# Patient Record
Sex: MALE | Race: WHITE | NOT HISPANIC OR LATINO | Employment: OTHER | URBAN - METROPOLITAN AREA
[De-identification: names, ages, dates, MRNs, and addresses within clinical notes are randomized per-mention and may not be internally consistent; named-entity substitution may affect disease eponyms.]

---

## 2017-01-04 ENCOUNTER — HOSPITAL ENCOUNTER (OUTPATIENT)
Dept: CARDIOLOGY | Facility: CLINIC | Age: 72
Discharge: HOME | End: 2017-01-04
Payer: COMMERCIAL

## 2017-01-04 ENCOUNTER — OFFICE VISIT (OUTPATIENT)
Dept: ORTHOPEDICS | Facility: CLINIC | Age: 72
End: 2017-01-04
Payer: COMMERCIAL

## 2017-01-04 ENCOUNTER — HOSPITAL ENCOUNTER (OUTPATIENT)
Dept: RADIOLOGY | Facility: HOSPITAL | Age: 72
Discharge: HOME | End: 2017-01-04
Attending: ORTHOPAEDIC SURGERY
Payer: COMMERCIAL

## 2017-01-04 VITALS
BODY MASS INDEX: 25.29 KG/M2 | HEART RATE: 97 BPM | RESPIRATION RATE: 18 BRPM | WEIGHT: 166.88 LBS | TEMPERATURE: 98 F | HEIGHT: 68 IN | SYSTOLIC BLOOD PRESSURE: 160 MMHG | DIASTOLIC BLOOD PRESSURE: 80 MMHG

## 2017-01-04 DIAGNOSIS — M89.9 LESION OF LEFT HUMERUS: ICD-10-CM

## 2017-01-04 DIAGNOSIS — M89.9 LESION OF LEFT HUMERUS: Primary | ICD-10-CM

## 2017-01-04 PROCEDURE — 99999 PR PBB SHADOW E&M-EST. PATIENT-LVL IV: CPT | Mod: PBBFAC,,, | Performed by: ORTHOPAEDIC SURGERY

## 2017-01-04 PROCEDURE — 73060 X-RAY EXAM OF HUMERUS: CPT | Mod: TC,LT

## 2017-01-04 PROCEDURE — 71020 XR CHEST PA AND LATERAL PRE-OP: CPT | Mod: TC

## 2017-01-04 PROCEDURE — 99214 OFFICE O/P EST MOD 30 MIN: CPT | Mod: PBBFAC | Performed by: ORTHOPAEDIC SURGERY

## 2017-01-04 PROCEDURE — 73060 X-RAY EXAM OF HUMERUS: CPT | Mod: 26,LT,, | Performed by: RADIOLOGY

## 2017-01-04 PROCEDURE — 99205 OFFICE O/P NEW HI 60 MIN: CPT | Mod: S$PBB,,, | Performed by: ORTHOPAEDIC SURGERY

## 2017-01-04 PROCEDURE — 93005 ELECTROCARDIOGRAM TRACING: CPT | Mod: PBBFAC | Performed by: INTERNAL MEDICINE

## 2017-01-04 PROCEDURE — 93010 ELECTROCARDIOGRAM REPORT: CPT | Mod: S$PBB,,, | Performed by: INTERNAL MEDICINE

## 2017-01-04 PROCEDURE — 71020 XR CHEST PA AND LATERAL PRE-OP: CPT | Mod: 26,,, | Performed by: RADIOLOGY

## 2017-01-04 RX ORDER — ALBUTEROL SULFATE 90 UG/1
2 AEROSOL, METERED RESPIRATORY (INHALATION) EVERY 6 HOURS PRN
COMMUNITY

## 2017-01-04 RX ORDER — TAMSULOSIN HYDROCHLORIDE 0.4 MG/1
0.4 CAPSULE ORAL DAILY
COMMUNITY

## 2017-01-04 RX ORDER — OXYMETAZOLINE HCL 0.05 %
2 SPRAY, NON-AEROSOL (ML) NASAL 2 TIMES DAILY
COMMUNITY

## 2017-01-04 RX ORDER — TIOTROPIUM BROMIDE 18 UG/1
18 CAPSULE ORAL; RESPIRATORY (INHALATION) DAILY
COMMUNITY

## 2017-01-04 RX ORDER — ELECTROLYTES/DEXTROSE
SOLUTION, ORAL ORAL DAILY
COMMUNITY

## 2017-01-04 RX ORDER — AMOXICILLIN AND CLAVULANATE POTASSIUM 875; 125 MG/1; MG/1
1 TABLET, FILM COATED ORAL 2 TIMES DAILY
COMMUNITY

## 2017-01-04 RX ORDER — LANOLIN ALCOHOL/MO/W.PET/CERES
100 CREAM (GRAM) TOPICAL DAILY
COMMUNITY

## 2017-01-04 RX ORDER — HYDROCODONE BITARTRATE AND ACETAMINOPHEN 5; 325 MG/1; MG/1
1 TABLET ORAL EVERY 6 HOURS PRN
COMMUNITY

## 2017-01-04 RX ORDER — ASPIRIN 81 MG/1
81 TABLET ORAL DAILY
COMMUNITY

## 2017-01-04 RX ORDER — NIFEDIPINE 60 MG/1
30 TABLET, EXTENDED RELEASE ORAL DAILY
COMMUNITY

## 2017-01-04 RX ORDER — POTASSIUM GLUCONATE 2 MEQ
TABLET ORAL DAILY
COMMUNITY

## 2017-01-04 RX ORDER — PRAVASTATIN SODIUM 40 MG/1
40 TABLET ORAL DAILY
COMMUNITY

## 2017-01-04 RX ORDER — MUPIROCIN 20 MG/G
OINTMENT TOPICAL 3 TIMES DAILY
COMMUNITY

## 2017-01-04 NOTE — PROGRESS NOTES
CHIEF COMPLAINT:  left humerus mass.                                                                   HISTORY OF PRESENT ILLNESS:  The patient is a 71 y.o. male  who presents  for evaluation of his left humerus. He was recently found to have a left humerus lesion and also a lung mass    Pain Duration: 6 months  Pain Quality: dull  Pain Context:worsening  Pain Timing: constant  Pain Location: left elbow  Pain Severity: moderate    Night pain: Positive    Growth: No    He  presents for further treatment recommendations.   He denies cough, sputum production, and admits chronic shortness of breath,   fever, chills, night sweats or weight loss.    He denies distal paresthesias.  He has no history of prior trauma.                                                                                                                       PAST MEDICAL HISTORY:    Past Medical History   Diagnosis Date    COPD (chronic obstructive pulmonary disease)     High cholesterol     Hypertension                                                                                                               PAST SURGICAL HISTORY:    Past Surgical History   Procedure Laterality Date    Knee surgery Right 2011     rotator cuff repair    Inguinal hernia repair Right 2010    Inguinal hernia repair Left 1979                                                                                                                 SOCIAL HISTORY:  Reviewed for tobacco or alcohol use and he  is an active  71 y.o.  male                                                                             FAMILY MEDICAL HISTORY:  family history includes Cancer in his sister; Diabetes in his sister; Heart attack in his brother; Heart disease in his mother and sister; Other in his father, mother, and sister.       Review of patient's allergies indicates:  No Known Allergies      Current Outpatient Prescriptions:     albuterol 90 mcg/actuation inhaler, Inhale 2 puffs  into the lungs every 6 (six) hours as needed for Wheezing., Disp: , Rfl:     amoxicillin-clavulanate 875-125mg (AUGMENTIN) 875-125 mg per tablet, Take 1 tablet by mouth 2 (two) times daily., Disp: , Rfl:     Ca-D3-mag ox-zinc--lisette-bor (CALCIUM 600-D3 PLUS) 600 mg calcium- 800 unit-50 mg Tab, Take by mouth once daily., Disp: , Rfl:     cyanocobalamin (VITAMIN B-12) 1000 MCG tablet, Take 100 mcg by mouth once daily., Disp: , Rfl:     ERGOCALCIFEROL, VITAMIN D2, (VITAMIN D ORAL), Take 400 % by mouth once daily., Disp: , Rfl:     hydrocodone-acetaminophen 5-325mg (NORCO) 5-325 mg per tablet, Take 1 tablet by mouth every 6 (six) hours as needed for Pain., Disp: , Rfl:     mupirocin (BACTROBAN) 2 % ointment, Apply topically 3 (three) times daily., Disp: , Rfl:     nifedipine (ADALAT CC) 60 MG TbSR, Take 30 mg by mouth once daily., Disp: , Rfl:     oxymetazoline (AFRIN) 0.05 % nasal spray, 2 sprays by Nasal route 2 (two) times daily., Disp: , Rfl:     potassium gluconate 500 mg (83 mg) Tab, Take by mouth once daily., Disp: , Rfl:     pravastatin (PRAVACHOL) 40 MG tablet, Take 40 mg by mouth once daily., Disp: , Rfl:     sodium chloride (OCEAN) 0.65 % nasal spray, 1 spray by Nasal route as needed for Congestion., Disp: , Rfl:     tamsulosin (FLOMAX) 0.4 mg Cp24, Take 0.4 mg by mouth once daily., Disp: , Rfl:     tiotropium (SPIRIVA) 18 mcg inhalation capsule, Inhale 18 mcg into the lungs once daily., Disp: , Rfl:     aspirin (ECOTRIN) 81 MG EC tablet, Take 81 mg by mouth once daily., Disp: , Rfl:                                                                                                 REVIEW OF SYSTEMS:  Full 10-system review of systems was performed today.    He denies joint arthralgias or back pain.   Denies chest pain, palpitations.  Admits chronic SOB  Denies excessive thirst, urination or heat or cold intolerance.   Denies nausea, vomiting, melena or hematochezia.    Denies fever, chills, night  sweats, weight loss.    Denies dysuria or hematuria.   Denies history of anxiety or depression.    Denies change in vision or hearing.    Denies any skin abnormalities or rash.   Denies upper or lower extremity paresthesias or lightheadedness.    Denies cough, shortness of breath or hemoptysis.                                                                                                                                     PHYSICAL EXAMINATION:                                                        GENERAL:  A well-developed, well-nourished 71 y.o. male who is alert and       oriented in no acute distress.      Gait: He  walks with a normal gait.                   EXTREMITIES:  Examination of upper extremities reveals there is not a visible mass or deformity    The skin over both upper extremities is normal and unremarkable.    He has a painless range of motion of the shoulders, right elbows, and wrists            bilaterally.  Rom left elbow not performed due to risk of fracture  There is no axillary or cervical adenopathy bilaterally.   Sensation is intact in both upper extremities.    There are no motor deficits in the upper extremities bilaterally.    Radial pulses are palpable distally bilaterally.    He has no tenderness to palpation nor edema.   He does not havea palpable mass.    HEENT: normocephalic and atraumatic, EOMI, sclera white.  Heart: RRR without appreciable mumrur  Lungs: CTA  Abdomen: soft, non-tender             AP and lateral radiographs of the left humerus were ordered and personally reviewed with the patient today.  I have also reviewed his MRI and CT.  These show a lucent lesion of the left distal humerus.                                                                                                 IMPRESSION: Probable metastatic lung c.a.                                                                                                                  PLAN: I have had a long discussion with  the patient and family today about treatment options.    He is at high risk of pathologic fracture.  It was discussed with the patient that the only way to obtain or confirm the diagnosis is via histologic evaluation of the lesion.  This can be done through open or needle biopsy.  I have discussed that the lesion is not amenable to needle biopsy and open biopsy would be my preferred method of biopsy.  Further operative intervention would be determined pending the results of the frozen section analysis.  If this is metastatic c.a. I would proceed with prophylactic plating followed by curettage and cementation of the defect.  Risks and complications were discussed including but not limited to the risks of radial nerve palsy, perioperative fracture, anesthetic complications, infection, wound healing complications, pain, stiffness, DVT, pulmonary embolism, perioperative medical risks (cardiac, pulmonary, renal, neurologic), and death among others were discussed, including the risk of local recurrence and metastatic disease.  The patient elects to proceed.

## 2017-01-05 ENCOUNTER — ANESTHESIA EVENT (OUTPATIENT)
Dept: SURGERY | Facility: HOSPITAL | Age: 72
End: 2017-01-05
Payer: COMMERCIAL

## 2017-01-06 ENCOUNTER — ANESTHESIA (OUTPATIENT)
Dept: SURGERY | Facility: HOSPITAL | Age: 72
End: 2017-01-06
Payer: COMMERCIAL

## 2017-01-06 ENCOUNTER — SURGERY (OUTPATIENT)
Age: 72
End: 2017-01-06

## 2017-01-06 ENCOUNTER — HOSPITAL ENCOUNTER (OUTPATIENT)
Facility: HOSPITAL | Age: 72
Discharge: HOME | End: 2017-01-06
Attending: ORTHOPAEDIC SURGERY | Admitting: ORTHOPAEDIC SURGERY
Payer: COMMERCIAL

## 2017-01-06 VITALS
TEMPERATURE: 98 F | HEART RATE: 87 BPM | RESPIRATION RATE: 18 BRPM | WEIGHT: 166.88 LBS | DIASTOLIC BLOOD PRESSURE: 75 MMHG | BODY MASS INDEX: 25.29 KG/M2 | HEIGHT: 68 IN | SYSTOLIC BLOOD PRESSURE: 136 MMHG | OXYGEN SATURATION: 100 %

## 2017-01-06 PROCEDURE — 71000045 HC DOSC ROUTINE RECOVERY EA ADD'L HR: Performed by: ORTHOPAEDIC SURGERY

## 2017-01-06 PROCEDURE — 64415 NJX AA&/STRD BRCH PLXS IMG: CPT | Mod: 59,LT,, | Performed by: ANESTHESIOLOGY

## 2017-01-06 PROCEDURE — 37000009 HC ANESTHESIA EA ADD 15 MINS: Performed by: ORTHOPAEDIC SURGERY

## 2017-01-06 PROCEDURE — 25000003 PHARM REV CODE 250: Performed by: STUDENT IN AN ORGANIZED HEALTH CARE EDUCATION/TRAINING PROGRAM

## 2017-01-06 PROCEDURE — D9220A PRA ANESTHESIA: Mod: CRNA,,, | Performed by: NURSE ANESTHETIST, CERTIFIED REGISTERED

## 2017-01-06 PROCEDURE — 27200750 HC INSULATED NEEDLE/ STIMUPLEX: Performed by: STUDENT IN AN ORGANIZED HEALTH CARE EDUCATION/TRAINING PROGRAM

## 2017-01-06 PROCEDURE — 88342 IMHCHEM/IMCYTCHM 1ST ANTB: CPT | Mod: 26,,, | Performed by: PATHOLOGY

## 2017-01-06 PROCEDURE — 63600175 PHARM REV CODE 636 W HCPCS: Performed by: STUDENT IN AN ORGANIZED HEALTH CARE EDUCATION/TRAINING PROGRAM

## 2017-01-06 PROCEDURE — 76942 ECHO GUIDE FOR BIOPSY: CPT | Performed by: ANESTHESIOLOGY

## 2017-01-06 PROCEDURE — 88305 TISSUE EXAM BY PATHOLOGIST: CPT | Mod: 26,,, | Performed by: PATHOLOGY

## 2017-01-06 PROCEDURE — 71000015 HC POSTOP RECOV 1ST HR: Performed by: ORTHOPAEDIC SURGERY

## 2017-01-06 PROCEDURE — 36000710: Performed by: ORTHOPAEDIC SURGERY

## 2017-01-06 PROCEDURE — 88341 IMHCHEM/IMCYTCHM EA ADD ANTB: CPT | Mod: 26,,, | Performed by: PATHOLOGY

## 2017-01-06 PROCEDURE — C1713 ANCHOR/SCREW BN/BN,TIS/BN: HCPCS | Performed by: ORTHOPAEDIC SURGERY

## 2017-01-06 PROCEDURE — 27201423 OPTIME MED/SURG SUP & DEVICES STERILE SUPPLY: Performed by: ORTHOPAEDIC SURGERY

## 2017-01-06 PROCEDURE — 71000044 HC DOSC ROUTINE RECOVERY FIRST HOUR: Performed by: ORTHOPAEDIC SURGERY

## 2017-01-06 PROCEDURE — D9220A PRA ANESTHESIA: Mod: ANES,,, | Performed by: ANESTHESIOLOGY

## 2017-01-06 PROCEDURE — 63600175 PHARM REV CODE 636 W HCPCS: Performed by: NURSE ANESTHETIST, CERTIFIED REGISTERED

## 2017-01-06 PROCEDURE — 20245 BONE BIOPSY OPEN DEEP: CPT | Mod: 51,,, | Performed by: ORTHOPAEDIC SURGERY

## 2017-01-06 PROCEDURE — 24110 EXC/CURTG B1 CST/B9 TUM HUM: CPT | Mod: 59,LT,, | Performed by: ORTHOPAEDIC SURGERY

## 2017-01-06 PROCEDURE — 37000008 HC ANESTHESIA 1ST 15 MINUTES: Performed by: ORTHOPAEDIC SURGERY

## 2017-01-06 PROCEDURE — 88331 PATH CONSLTJ SURG 1 BLK 1SPC: CPT | Performed by: PATHOLOGY

## 2017-01-06 PROCEDURE — 88305 TISSUE EXAM BY PATHOLOGIST: CPT | Performed by: PATHOLOGY

## 2017-01-06 PROCEDURE — 25000003 PHARM REV CODE 250: Performed by: NURSE ANESTHETIST, CERTIFIED REGISTERED

## 2017-01-06 PROCEDURE — 88331 PATH CONSLTJ SURG 1 BLK 1SPC: CPT | Mod: 26,,, | Performed by: PATHOLOGY

## 2017-01-06 PROCEDURE — 24498 PROPH TX W/WO MTHYLMTHCRYLT: CPT | Mod: LT,,, | Performed by: ORTHOPAEDIC SURGERY

## 2017-01-06 PROCEDURE — 36000711: Performed by: ORTHOPAEDIC SURGERY

## 2017-01-06 PROCEDURE — 25000003 PHARM REV CODE 250: Performed by: ANESTHESIOLOGY

## 2017-01-06 DEVICE — CEMENT BONE WHOLE BATCH: Type: IMPLANTABLE DEVICE | Site: HUMERUS | Status: FUNCTIONAL

## 2017-01-06 DEVICE — SCREW LOCK 3.5MM X 30MM: Type: IMPLANTABLE DEVICE | Site: HUMERUS | Status: FUNCTIONAL

## 2017-01-06 DEVICE — SCREW LOCKING 3.5MM X 36MM: Type: IMPLANTABLE DEVICE | Site: HUMERUS | Status: FUNCTIONAL

## 2017-01-06 DEVICE — SCREW LOCK 3.5MM X 26MM: Type: IMPLANTABLE DEVICE | Site: HUMERUS | Status: FUNCTIONAL

## 2017-01-06 DEVICE — SCREW LOCKING 3.5MM X 28MM: Type: IMPLANTABLE DEVICE | Site: HUMERUS | Status: FUNCTIONAL

## 2017-01-06 RX ORDER — KETAMINE HYDROCHLORIDE 100 MG/ML
INJECTION, SOLUTION INTRAMUSCULAR; INTRAVENOUS
Status: DISCONTINUED | OUTPATIENT
Start: 2017-01-06 | End: 2017-01-06

## 2017-01-06 RX ORDER — ONDANSETRON 4 MG/1
4 TABLET, ORALLY DISINTEGRATING ORAL EVERY 8 HOURS PRN
Qty: 30 TABLET | Refills: 0 | Status: SHIPPED | OUTPATIENT
Start: 2017-01-06

## 2017-01-06 RX ORDER — OXYCODONE AND ACETAMINOPHEN 10; 325 MG/1; MG/1
1 TABLET ORAL ONCE
Status: COMPLETED | OUTPATIENT
Start: 2017-01-06 | End: 2017-01-06

## 2017-01-06 RX ORDER — BUPIVACAINE HYDROCHLORIDE 5 MG/ML
INJECTION, SOLUTION EPIDURAL; INTRACAUDAL
Status: DISCONTINUED
Start: 2017-01-06 | End: 2017-01-06 | Stop reason: HOSPADM

## 2017-01-06 RX ORDER — MIDAZOLAM HYDROCHLORIDE 1 MG/ML
INJECTION, SOLUTION INTRAMUSCULAR; INTRAVENOUS
Status: DISCONTINUED | OUTPATIENT
Start: 2017-01-06 | End: 2017-01-06

## 2017-01-06 RX ORDER — LIDOCAINE HYDROCHLORIDE 10 MG/ML
1 INJECTION, SOLUTION EPIDURAL; INFILTRATION; INTRACAUDAL; PERINEURAL ONCE
Status: DISCONTINUED | OUTPATIENT
Start: 2017-01-06 | End: 2017-01-06 | Stop reason: HOSPADM

## 2017-01-06 RX ORDER — PROPOFOL 10 MG/ML
VIAL (ML) INTRAVENOUS
Status: DISCONTINUED | OUTPATIENT
Start: 2017-01-06 | End: 2017-01-06

## 2017-01-06 RX ORDER — MIDAZOLAM HYDROCHLORIDE 1 MG/ML
1 INJECTION INTRAMUSCULAR; INTRAVENOUS EVERY 5 MIN PRN
Status: DISCONTINUED | OUTPATIENT
Start: 2017-01-07 | End: 2017-01-06 | Stop reason: HOSPADM

## 2017-01-06 RX ORDER — PROPOFOL 10 MG/ML
VIAL (ML) INTRAVENOUS CONTINUOUS PRN
Status: DISCONTINUED | OUTPATIENT
Start: 2017-01-06 | End: 2017-01-06

## 2017-01-06 RX ORDER — ASPIRIN 81 MG
100 TABLET, DELAYED RELEASE (ENTERIC COATED) ORAL 2 TIMES DAILY
Qty: 56 TABLET | Refills: 0 | Status: SHIPPED | OUTPATIENT
Start: 2017-01-06 | End: 2017-02-03

## 2017-01-06 RX ORDER — FENTANYL CITRATE 50 UG/ML
25 INJECTION, SOLUTION INTRAMUSCULAR; INTRAVENOUS EVERY 5 MIN PRN
Status: DISCONTINUED | OUTPATIENT
Start: 2017-01-07 | End: 2017-01-06 | Stop reason: HOSPADM

## 2017-01-06 RX ORDER — OXYCODONE AND ACETAMINOPHEN 10; 325 MG/1; MG/1
1 TABLET ORAL EVERY 4 HOURS PRN
Qty: 70 TABLET | Refills: 0 | Status: SHIPPED | OUTPATIENT
Start: 2017-01-06 | End: 2017-01-25 | Stop reason: SDUPTHER

## 2017-01-06 RX ORDER — SODIUM CHLORIDE 9 MG/ML
INJECTION, SOLUTION INTRAVENOUS CONTINUOUS
Status: DISCONTINUED | OUTPATIENT
Start: 2017-01-06 | End: 2017-01-06 | Stop reason: HOSPADM

## 2017-01-06 RX ORDER — LIDOCAINE HCL/PF 100 MG/5ML
SYRINGE (ML) INTRAVENOUS
Status: DISCONTINUED | OUTPATIENT
Start: 2017-01-06 | End: 2017-01-06

## 2017-01-06 RX ORDER — EPINEPHRINE 1 MG/ML
INJECTION, SOLUTION INTRACARDIAC; INTRAMUSCULAR; INTRAVENOUS; SUBCUTANEOUS
Status: DISCONTINUED
Start: 2017-01-06 | End: 2017-01-06 | Stop reason: HOSPADM

## 2017-01-06 RX ORDER — SODIUM CHLORIDE 0.9 % (FLUSH) 0.9 %
3 SYRINGE (ML) INJECTION
Status: DISCONTINUED | OUTPATIENT
Start: 2017-01-06 | End: 2017-01-06 | Stop reason: HOSPADM

## 2017-01-06 RX ORDER — HYDROMORPHONE HYDROCHLORIDE 1 MG/ML
0.2 INJECTION, SOLUTION INTRAMUSCULAR; INTRAVENOUS; SUBCUTANEOUS EVERY 5 MIN PRN
Status: DISCONTINUED | OUTPATIENT
Start: 2017-01-06 | End: 2017-01-06 | Stop reason: HOSPADM

## 2017-01-06 RX ORDER — SODIUM CHLORIDE 0.9 % (FLUSH) 0.9 %
3 SYRINGE (ML) INJECTION EVERY 8 HOURS
Status: DISCONTINUED | OUTPATIENT
Start: 2017-01-06 | End: 2017-01-06 | Stop reason: HOSPADM

## 2017-01-06 RX ADMIN — OXYCODONE HYDROCHLORIDE AND ACETAMINOPHEN 1 TABLET: 10; 325 TABLET ORAL at 05:01

## 2017-01-06 RX ADMIN — PROPOFOL 20 MG: 10 INJECTION, EMULSION INTRAVENOUS at 02:01

## 2017-01-06 RX ADMIN — FENTANYL CITRATE 50 MCG: 50 INJECTION INTRAMUSCULAR; INTRAVENOUS at 01:01

## 2017-01-06 RX ADMIN — KETAMINE HYDROCHLORIDE 20 MG: 100 INJECTION, SOLUTION, CONCENTRATE INTRAMUSCULAR; INTRAVENOUS at 02:01

## 2017-01-06 RX ADMIN — Medication 2 G: at 02:01

## 2017-01-06 RX ADMIN — MIDAZOLAM HYDROCHLORIDE 0.5 MG: 1 INJECTION, SOLUTION INTRAMUSCULAR; INTRAVENOUS at 02:01

## 2017-01-06 RX ADMIN — PROPOFOL 40 MCG/KG/MIN: 10 INJECTION, EMULSION INTRAVENOUS at 02:01

## 2017-01-06 RX ADMIN — SODIUM CHLORIDE: 0.9 INJECTION, SOLUTION INTRAVENOUS at 12:01

## 2017-01-06 RX ADMIN — MIDAZOLAM HYDROCHLORIDE 1 MG: 1 INJECTION, SOLUTION INTRAMUSCULAR; INTRAVENOUS at 01:01

## 2017-01-06 RX ADMIN — LIDOCAINE HYDROCHLORIDE 20 MG: 20 INJECTION, SOLUTION INTRAVENOUS at 02:01

## 2017-01-06 NOTE — IP AVS SNAPSHOT
[address]  [place] [address]  Phone: [phone]           I have received a copy of my After Visit Summary and discharge instructions from [facility].    INSTRUCTIONS RECEIVED AND UNDERSTOOD BY:                     Patient/Patient Representative: ________________________________________________________________     Date/Time: ________________________________________________________________                     Instructions Given By: ________________________________________________________________     Date/Time: ________________________________________________________________

## 2017-01-06 NOTE — DISCHARGE INSTRUCTIONS
Understanding Arm Fracture Open Reduction and Internal Fixation (ORIF)  Open reduction and internal fixation (ORIF) is a type of treatment to fix a broken bone. It puts the pieces of a broken bone back together so they can heal. Open reduction means the bones are put back in place during surgery. Internal fixation means that special hardware is used to hold the bone pieces together. This helps the bone heal correctly. The procedure is done by an orthopedic surgeon. This is a doctor with special training in treating bone, joint, and muscle problems.  How an arm fracture happens  The humerus is the bone in the upper part of your arm. An injury may cause it to break (fracture) into 2 or more pieces. Your humerus may be broken near your shoulder, in the middle of your upper arm, or near your elbow. The humerus can break with the pieces lined up correctly. Or the pieces of bone may not be lined up correctly. This is called a displaced fracture.  Why arm fracture ORIF is done  You are likely to need ORIF if:  · You have a displaced fracture  · Part of your humerus broke through the skin  · Your humerus broke into several pieces  How arm fracture ORIF is done  The surgery is done by an orthopedic surgeon. This is a surgeon who specializes in treating bone, muscle, joint, and tendon problems. The surgery can be done in several ways. The surgeon will make a cut (incision) through the skin and muscles of your arm. Or an incision will be made on the top of the shoulder. The surgeon puts the pieces of your humerus back in place. This is the reduction. Then special screws, plates, wires, or nails are used to hold the bone pieces together. This is the fixation.  Risks of arm fracture ORIF  All surgery has risks. The risks of arm fracture ORIF include:  · Damage to the humerus from screws  · Broken screws or plates  · Infection  · Bleeding  · Nerve damage  · Death of part of the humerus (avascular necrosis)  · Loss of  movement  · Misaligned bone  · Need for additional surgery  · Problems from anesthesia  Your risks vary based on your age and general health. For example, if you are a smoker or if you have low bone density, you may have a higher risk for certain problems. People with diabetes that is not controlled well may also have a higher risk for problems. Talk with your healthcare provider about which risks apply most to you.  © 0132-3772 [employer]. [address] [place] [address], [place] [address]. All rights reserved. This information is not intended as a substitute for professional medical care. Always follow your healthcare professional's instructions.

## 2017-01-06 NOTE — IP AVS SNAPSHOT
[facility]  [address]  [facility] [address]  Phone: [phone]           Patient Discharge Instructions     Our goal is to set you up for success. This packet includes information on your condition, medications, and your home care. It will help you to care for yourself so you don't get sicker and need to go back to the hospital.     Please ask your nurse if you have any questions.        There are many details to remember when preparing to leave the hospital. Here is what you will need to do:    1. Take your medicine. If you are prescribed medications, review your Medication List in the following pages. You may have new medications to  at the pharmacy and others that you'll need to stop taking. Review the instructions for how and when to take your medications. Talk with your doctor or nurses if you are unsure of what to do.     2. Go to your follow-up appointments. Specific follow-up information is listed in the following pages. Your may be contacted by a transition nurse or clinical provider about future appointments. Be sure we have all of the phone numbers to reach you, if needed. Please contact your provider's office if you are unable to make an appointment.     3. Watch for warning signs. Your doctor or nurse will give you detailed warning signs to watch for and when to call for assistance. These instructions may also include educational information about your condition. If you experience any of warning signs to your health, call your doctor.               [facility]  Unless otherwise directed by your provider, please contact [facility], our nurse care line that is available for 24/7 assistance.     [phone] (toll-free)    Registered nurses in the [facility] provide clinical advisement, health education, appointment booking, and other advisory services.                    ** Verify the list of medication(s) below is accurate and up  to date. Carry this with you in case of emergency. If your medications have changed, please notify your healthcare provider.             Medication List      START taking these medications        Additional Info                      docusate sodium 100 mg capsule   Quantity:  56 tablet   Refills:  0   Dose:  100 mg    Instructions:  Take 100 mg by mouth 2 (two) times daily.     Begin Date    AM    Noon    PM    Bedtime       ondansetron 4 MG Tbdl   Commonly known as:  ZOFRAN-ODT   Quantity:  30 tablet   Refills:  0   Dose:  4 mg    Instructions:  Take 1 tablet (4 mg total) by mouth every 8 (eight) hours as needed.     Begin Date    AM    Noon    PM    Bedtime       oxycodone-acetaminophen  mg per tablet   Commonly known as:  PERCOCET   Quantity:  70 tablet   Refills:  0   Dose:  1 tablet    Instructions:  Take 1 tablet by mouth every 4 (four) hours as needed for Pain.     Begin Date    AM    Noon    PM    Bedtime         CONTINUE taking these medications        Additional Info                      albuterol 90 mcg/actuation inhaler   Refills:  0   Dose:  2 puff    Instructions:  Inhale 2 puffs into the lungs every 6 (six) hours as needed for Wheezing.     Begin Date    AM    Noon    PM    Bedtime       aspirin 81 MG EC tablet   Commonly known as:  ECOTRIN   Refills:  0   Dose:  81 mg    Instructions:  Take 81 mg by mouth once daily.     Begin Date    AM    Noon    PM    Bedtime       AUGMENTIN 875-125 mg per tablet   Refills:  0   Dose:  1 tablet   Generic drug:  amoxicillin-clavulanate 875-125mg    Instructions:  Take 1 tablet by mouth 2 (two) times daily.     Begin Date    AM    Noon    PM    Bedtime       CALCIUM 600-D3 PLUS 600 mg calcium- 800 unit-50 mg Tab   Refills:  0   Generic drug:  Ca-D3-mag ox-zinc--lisette-bor    Instructions:  Take by mouth once daily.     Begin Date    AM    Noon    PM    Bedtime       FLOMAX 0.4 mg Cp24   Refills:  0   Dose:  0.4 mg   Generic drug:  tamsulosin    Instructions:   Take 0.4 mg by mouth once daily.     Begin Date    AM    Noon    PM    Bedtime       hydrocodone-acetaminophen 5-325mg 5-325 mg per tablet   Commonly known as:  NORCO   Refills:  0   Dose:  1 tablet    Instructions:  Take 1 tablet by mouth every 6 (six) hours as needed for Pain.     Begin Date    AM    Noon    PM    Bedtime       mupirocin 2 % ointment   Commonly known as:  BACTROBAN   Refills:  0    Instructions:  Apply topically 3 (three) times daily.     Begin Date    AM    Noon    PM    Bedtime       nifedipine 60 MG Tbsr   Commonly known as:  ADALAT CC   Refills:  0   Dose:  30 mg    Instructions:  Take 30 mg by mouth once daily.     Begin Date    AM    Noon    PM    Bedtime       oxymetazoline 0.05 % nasal spray   Commonly known as:  AFRIN   Refills:  0   Dose:  2 spray    Instructions:  2 sprays by Nasal route 2 (two) times daily.     Begin Date    AM    Noon    PM    Bedtime       potassium gluconate 500 mg (83 mg) Tab   Refills:  0    Instructions:  Take by mouth once daily.     Begin Date    AM    Noon    PM    Bedtime       pravastatin 40 MG tablet   Commonly known as:  PRAVACHOL   Refills:  0   Dose:  40 mg    Instructions:  Take 40 mg by mouth once daily.     Begin Date    AM    Noon    PM    Bedtime       sodium chloride 0.65 % nasal spray   Commonly known as:  OCEAN   Refills:  0   Dose:  1 spray    Instructions:  1 spray by Nasal route as needed for Congestion.     Begin Date    AM    Noon    PM    Bedtime       tiotropium 18 mcg inhalation capsule   Commonly known as:  SPIRIVA   Refills:  0   Dose:  18 mcg    Instructions:  Inhale 18 mcg into the lungs once daily.     Begin Date    AM    Noon    PM    Bedtime       VITAMIN B-12 1000 MCG tablet   Refills:  0   Dose:  100 mcg   Generic drug:  cyanocobalamin    Instructions:  Take 100 mcg by mouth once daily.     Begin Date    AM    Noon    PM    Bedtime       VITAMIN D ORAL   Refills:  0   Dose:  400 %    Instructions:  Take 400 % by mouth once daily.      Begin Date    AM    Noon    PM    Bedtime            Where to Get Your Medications      You can get these medications from any pharmacy     Bring a paper prescription for each of these medications     docusate sodium 100 mg capsule    ondansetron 4 MG Tbdl    oxycodone-acetaminophen  mg per tablet                  Please bring to all follow up appointments:    1. A copy of your discharge instructions.  2. All medicines you are currently taking in their original bottles.  3. Identification and insurance card.    Please arrive 15 minutes ahead of scheduled appointment time.    Please call 24 hours in advance if you must reschedule your appointment and/or time.        Follow-up Information     Follow up with Enriqueta Ugalde NP. Schedule an appointment as soon as possible for a visit in 2 weeks.    Specialty:  Orthopedic Surgery    Why:  For suture removal    Contact information:    [address]  [facility] [id number] [phone]          Discharge Instructions     Future Orders    Activity as tolerated     Call MD for:  difficulty breathing or increased cough     Call MD for:  increased confusion or weakness     Call MD for:  persistent dizziness, light-headedness, or visual disturbances     Call MD for:  persistent nausea and vomiting or diarrhea     Call MD for:  redness, tenderness, or signs of infection (pain, swelling, redness, odor or green/yellow discharge around incision site)     Call MD for:  severe persistent headache     Call MD for:  severe uncontrolled pain     Call MD for:  temperature >100.4     Call MD for:  worsening rash     Diet general     Questions:    Total calories:      Fat restriction, if any:      Protein restriction, if any:      Na restriction, if any:      Fluid restriction:      Additional restrictions:      Lifting restrictions     Sponge bath only until clinic visit         Discharge Instructions         Understanding Arm Fracture Open Reduction and Internal Fixation  (ORIF)  Open reduction and internal fixation (ORIF) is a type of treatment to fix a broken bone. It puts the pieces of a broken bone back together so they can heal. Open reduction means the bones are put back in place during surgery. Internal fixation means that special hardware is used to hold the bone pieces together. This helps the bone heal correctly. The procedure is done by an orthopedic surgeon. This is a doctor with special training in treating bone, joint, and muscle problems.  How an arm fracture happens  The humerus is the bone in the upper part of your arm. An injury may cause it to break (fracture) into 2 or more pieces. Your humerus may be broken near your shoulder, in the middle of your upper arm, or near your elbow. The humerus can break with the pieces lined up correctly. Or the pieces of bone may not be lined up correctly. This is called a displaced fracture.  Why arm fracture ORIF is done  You are likely to need ORIF if:  · You have a displaced fracture  · Part of your humerus broke through the skin  · Your humerus broke into several pieces  How arm fracture ORIF is done  The surgery is done by an orthopedic surgeon. This is a surgeon who specializes in treating bone, muscle, joint, and tendon problems. The surgery can be done in several ways. The surgeon will make a cut (incision) through the skin and muscles of your arm. Or an incision will be made on the top of the shoulder. The surgeon puts the pieces of your humerus back in place. This is the reduction. Then special screws, plates, wires, or nails are used to hold the bone pieces together. This is the fixation.  Risks of arm fracture ORIF  All surgery has risks. The risks of arm fracture ORIF include:  · Damage to the humerus from screws  · Broken screws or plates  · Infection  · Bleeding  · Nerve damage  · Death of part of the humerus (avascular necrosis)  · Loss of movement  · Misaligned bone  · Need for additional surgery  · Problems from  "anesthesia  Your risks vary based on your age and general health. For example, if you are a smoker or if you have low bone density, you may have a higher risk for certain problems. People with diabetes that is not controlled well may also have a higher risk for problems. Talk with your healthcare provider about which risks apply most to you.  © 9144-0832 [employer]. [address] [place] [address]. All rights reserved. This information is not intended as a substitute for professional medical care. Always follow your healthcare professional's instructions.            Primary Diagnosis     Your primary diagnosis was:  Mass      Admission Information     Date & Time Provider Department CSN    1/6/2017 11:38 AM Dajuan Hill MD [facility] [id number]      Care Providers     Provider Role Specialty Primary office phone    Dajuan Hill MD Attending Provider Orthopedic Surgery [phone]    Dajuan Hill MD Surgeon  Orthopedic Surgery [phone]      Your Vitals Were     BP Pulse Temp Resp Height Weight    130/72 (BP Location: Right arm, Patient Position: Lying, BP Method: Automatic) 93 97.9 °F (36.6 °C) (Oral) 22 5' 8" (1.727 m) 75.7 kg (166 lb 14.2 oz)    SpO2 BMI             100% 25.38 kg/m2         Recent Lab Values     No lab values to display.      Pending Labs     Order Current Status    Specimen to Pathology - Surgery Collected (01/06/17 3619)      Allergies as of 1/6/2017     No Known Allergies      Advance Directives     An advance directive is a document which, in the event you are no longer able to make decisions for yourself, tells your healthcare team what kind of treatment you do or do not want to receive, or who you would like to make those decisions for you.  If you do not currently have an advance directive, [facility] encourages you to create one.  For more information call:  [phone] ([phone]), [phone] ([phone]),  or log on to " [email or web address].        Smoking Cessation     If you would like to quit smoking:   You may be eligible for free services if you are a [place] resident and started smoking cigarettes before September 1, 1988.  Call the Smoking Cessation Trust (SCT) toll free at (590) 080-9598 or (838) 068-3896.   Call 1-604-QUIT-NOW if you do not meet the above criteria.            Language Assistance Services     ATTENTION: Language assistance services are available, free of charge. Please call [phone].      ATENCIÓN: Si habla español, tiene a collier disposición servicios gratuitos de asistencia lingüística. Llame al [phone].     CHÚ Ý: N?u b?n nói Ti?ng Vi?t, có các d?ch v? h? tr? ngôn ng? mi?n phí dành cho b?n. G?i s? [phone].        [facility] Sign-Up     Activating your [facility] account is as easy as 1-2-3!     1) Visit [email or web address], select Sign Up Now, enter this activation code and your date of birth, then select Next.  [email or web address]  Expires: 2/20/2017  4:17 PM      2) Create a username and password to use when you visit [facility] in the future and select a security question in case you lose your password and select Next.    3) Enter your e-mail address and click Sign Up!    Additional Information  If you have questions, please e-mail [email or web address] or call [phone] to talk to our [facility] staff. Remember, [facility] is NOT to be used for urgent needs. For medical emergencies, dial 911.          [facility] complies with applicable Federal civil rights laws and does not discriminate on the basis of race, color, national origin, age, disability, or sex.

## 2017-01-06 NOTE — BRIEF OP NOTE
[facility]  Brief Operative Note     SUMMARY     Surgery Date: 1/6/2017     Surgeon(s) and Role:     * Dajuan Hill MD - Primary     * Deon Altamirano MD - Resident - Assisting        Pre-op Diagnosis:  Lesion of left humerus [M89.9]    Post-op Diagnosis:  Post-Op Diagnosis Codes:     * Lesion of left humerus [M89.9]    Procedure(s) (LRB):  EXCISION MASS EXTREMITY (Left)  OPEN REDUCTION INTERNAL FIXATION-HUMERUS (Left)    Anesthesia: Regional    Description of the findings of the procedure: lesion cemented and plated    Findings/Key Components: as above    Estimated Blood Loss: * No values recorded between 1/6/2017  2:23 PM and 1/6/2017  3:39 PM *         Specimens:   Specimen (12h ago through future)    Start     Ordered    01/06/17 1455  Specimen to Pathology - Surgery  Once     Comments:  1. Left humerus evaluate for metastatic carcinoma-frozen  2. Left humerus evaluate for metastatic carcinoma-permanent    01/06/17 1455          Discharge Note    SUMMARY     Admit Date: 1/6/2017    Discharge Date and Time: 1/6/2106  Hospital Course (synopsis of major diagnoses, care, treatment, and services provided during the course of the hospital stay): Pt admitted for outpatient procedure, tolerated well.  Recovered in PACU and was discharged home on day of surgery.      Final Diagnosis: Post-Op Diagnosis Codes:     * Lesion of left humerus [M89.9]    Disposition: Home or Self Care    Follow Up/Patient Instructions:     Medications:  Reconciled Home Medications:   Current Discharge Medication List      START taking these medications    Details   docusate sodium 100 mg capsule Take 100 mg by mouth 2 (two) times daily.  Qty: 56 tablet, Refills: 0      ondansetron (ZOFRAN-ODT) 4 MG TbDL Take 1 tablet (4 mg total) by mouth every 8 (eight) hours as needed.  Qty: 30 tablet, Refills: 0      oxycodone-acetaminophen (PERCOCET)  mg per tablet Take 1 tablet by mouth every 4 (four) hours as needed for  Pain.  Qty: 70 tablet, Refills: 0         CONTINUE these medications which have NOT CHANGED    Details   albuterol 90 mcg/actuation inhaler Inhale 2 puffs into the lungs every 6 (six) hours as needed for Wheezing.      hydrocodone-acetaminophen 5-325mg (NORCO) 5-325 mg per tablet Take 1 tablet by mouth every 6 (six) hours as needed for Pain.      mupirocin (BACTROBAN) 2 % ointment Apply topically 3 (three) times daily.      nifedipine (ADALAT CC) 60 MG TbSR Take 30 mg by mouth once daily.      potassium gluconate 500 mg (83 mg) Tab Take by mouth once daily.      pravastatin (PRAVACHOL) 40 MG tablet Take 40 mg by mouth once daily.      sodium chloride (OCEAN) 0.65 % nasal spray 1 spray by Nasal route as needed for Congestion.      tamsulosin (FLOMAX) 0.4 mg Cp24 Take 0.4 mg by mouth once daily.      tiotropium (SPIRIVA) 18 mcg inhalation capsule Inhale 18 mcg into the lungs once daily.      amoxicillin-clavulanate 875-125mg (AUGMENTIN) 875-125 mg per tablet Take 1 tablet by mouth 2 (two) times daily.      aspirin (ECOTRIN) 81 MG EC tablet Take 81 mg by mouth once daily.      Ca-D3-mag ox-zinc--lisette-bor (CALCIUM 600-D3 PLUS) 600 mg calcium- 800 unit-50 mg Tab Take by mouth once daily.      cyanocobalamin (VITAMIN B-12) 1000 MCG tablet Take 100 mcg by mouth once daily.      ERGOCALCIFEROL, VITAMIN D2, (VITAMIN D ORAL) Take 400 % by mouth once daily.      oxymetazoline (AFRIN) 0.05 % nasal spray 2 sprays by Nasal route 2 (two) times daily.           No discharge procedures on file.  Follow-up Information     Follow up with Enriqueta Ugalde NP. Schedule an appointment as soon as possible for a visit in 2 weeks.    Specialty:  Orthopedic Surgery    Why:  For suture removal    Contact information:    [address]  [facility] [address] [phone]

## 2017-01-06 NOTE — ANESTHESIA PROCEDURE NOTES
Supraclavicular Single Shot Block    Patient location during procedure: pre-op   Block not for primary anesthetic.  Reason for block: at surgeon's request and post-op pain management   Post-op Pain Location: Left arm pain   Start time: 1/6/2017 1:08 PM  Timeout: 1/6/2017 1:08 PM   End time: 1/6/2017 1:15 PM  Staffing  Anesthesiologist: AVI JACKSON  Resident/CRNA: SAM ROMO  Performed by: resident/CRNA   Preanesthetic Checklist  Completed: patient identified, site marked, surgical consent, pre-op evaluation, timeout performed, IV checked, risks and benefits discussed and monitors and equipment checked  Peripheral Block  Patient position: supine  Prep: ChloraPrep  Patient monitoring: heart rate, cardiac monitor, continuous pulse ox, continuous capnometry and frequent blood pressure checks  Block type: supraclavicular  Laterality: left  Injection technique: single shot  Needle  Needle type: Stimuplex   Needle gauge: 22 G  Needle length: 2 in  Needle localization: anatomical landmarks and ultrasound guidance   -ultrasound image captured on disc.  Assessment  Injection assessment: negative aspiration, negative parasthesia and local visualized surrounding nerve  Paresthesia pain: none  Heart rate change: no  Slow fractionated injection: yes  Medications:  Bolus administered: 30 mL of 0.5 bupivacaine  Epinephrine added: 3.75 mcg/mL (1/300,000)  Additional Notes  VSS.  [facility] RN monitoring vitals throughout procedure.  Patient tolerated procedure well.

## 2017-01-06 NOTE — TRANSFER OF CARE
"Anesthesia Transfer of Care Note    Patient: Derek Wallace    Procedure(s) Performed: Procedure(s) (LRB):  EXCISION MASS EXTREMITY (Left)  OPEN REDUCTION INTERNAL FIXATION-HUMERUS (Left)    Patient location: [facility]    Anesthesia Type: regional and general    Transport from OR: Transported from OR on 6-10 L/min O2 by face mask with adequate spontaneous ventilation    Post pain: adequate analgesia    Post assessment: no apparent anesthetic complications and tolerated procedure well    Post vital signs: stable    Level of consciousness: awake, alert and oriented    Nausea/Vomiting: no nausea/vomiting    Complications: none          Last vitals:   Visit Vitals    BP (!) 90/58 (BP Location: Right arm, Patient Position: Lying, BP Method: Automatic)    Pulse 92    Temp 36.8 °C (98.2 °F) (Oral)    Resp 18    Ht 5' 8" (1.727 m)    Wt 75.7 kg (166 lb 14.2 oz)    SpO2 100%    BMI 25.38 kg/m2     "

## 2017-01-06 NOTE — ANESTHESIA RELEASE NOTE
Anesthesia Release from PACU Note    Patient: Derek Wallace    Procedure(s) Performed: Procedure(s) (LRB):  EXCISION MASS EXTREMITY (Left)  OPEN REDUCTION INTERNAL FIXATION-HUMERUS (Left)    Anesthesia type: Regional     Post pain: Adequate analgesia    Post assessment: no apparent anesthetic complications, tolerated procedure well and no evidence of recall    Last Vitals:   Vitals:    01/06/17 1645   BP: (!) 145/70   Pulse: 88   Resp: 20   Temp:    SpO2: 100%       Post vital signs: stable    Level of consciousness: awake and alert     Nausea/Vomiting: no nausea/no vomiting    Complications: none    Airway Patency: patent    Respiratory: unassisted, spontaneous ventilation    Cardiovascular: stable and blood pressure at baseline    Hydration: euvolemic

## 2017-01-06 NOTE — ANESTHESIA PREPROCEDURE EVALUATION
01/06/2017  Derke Wallace is a 72 y.o., male.    [facility] Anesthesia Evaluation    I have reviewed the Patient Summary Reports.    I have reviewed the Nursing Notes.      Review of Systems  Anesthesia Hx:  Denies Hx of Anesthetic complications    Cardiovascular:   Exercise tolerance: good Hypertension Denies CAD.     Denies Angina.        Pulmonary:   COPD, mild Denies Shortness of breath.  Denies Sleep Apnea. Lung Mass   Renal/:   Denies Chronic Renal Disease.     Hepatic/GI:   Denies GERD. Denies Liver Disease.    Neurological:   Denies CVA. Denies Seizures.    Endocrine:   Denies Diabetes. Denies Hypothyroidism.        Physical Exam  General:  Well nourished    Airway/Jaw/Neck:  Airway Findings: Mallampati: II TM Distance: Normal, at least 6 cm  Jaw/Neck Findings:  Neck ROM: Normal ROM       Chest/Lungs:  Chest/Lungs Findings: Normal Respiratory Rate     Heart/Vascular:  Heart Findings: Rate: Normal        Mental Status:  Mental Status Findings:  Cooperative, Alert and Oriented         Anesthesia Plan  Type of Anesthesia, risks & benefits discussed:  Anesthesia Type:  general  Patient's Preference: GA  Intra-op Monitoring Plan: standard ASA monitors  Intra-op Monitoring Plan Comments:   Post Op Pain Control Plan: single-shot nerve block  Post Op Pain Control Plan Comments: IV/PO opioids  Analgesia adjuncts and regional block  Induction:   IV  Beta Blocker:  Patient is not currently on a Beta-Blocker (No further documentation required).       Informed Consent: Patient understands risks and agrees with Anesthesia plan.  Questions answered. Anesthesia consent signed with patient.  ASA Score: 3     Day of Surgery Review of History & Physical:    H&P update referred to the surgeon.     Anesthesia Plan Notes: The patient's PMH was reviewed and PE was performed  Plan for GA        Ready For Surgery From Anesthesia  Perspective.

## 2017-01-07 ENCOUNTER — HOSPITAL ENCOUNTER (EMERGENCY)
Facility: HOSPITAL | Age: 72
Discharge: HOME | End: 2017-01-07
Attending: EMERGENCY MEDICINE
Payer: COMMERCIAL

## 2017-01-07 VITALS
BODY MASS INDEX: 25.01 KG/M2 | SYSTOLIC BLOOD PRESSURE: 124 MMHG | WEIGHT: 165 LBS | TEMPERATURE: 98 F | OXYGEN SATURATION: 100 % | DIASTOLIC BLOOD PRESSURE: 68 MMHG | RESPIRATION RATE: 16 BRPM | HEART RATE: 77 BPM | HEIGHT: 68 IN

## 2017-01-07 DIAGNOSIS — R60.0 EDEMA OF HAND: Primary | ICD-10-CM

## 2017-01-07 PROCEDURE — 63600175 PHARM REV CODE 636 W HCPCS: Performed by: PHYSICIAN ASSISTANT

## 2017-01-07 PROCEDURE — 99283 EMERGENCY DEPT VISIT LOW MDM: CPT | Mod: ,,, | Performed by: PHYSICIAN ASSISTANT

## 2017-01-07 PROCEDURE — 96372 THER/PROPH/DIAG INJ SC/IM: CPT

## 2017-01-07 PROCEDURE — 99284 EMERGENCY DEPT VISIT MOD MDM: CPT | Mod: 25

## 2017-01-07 RX ORDER — HYDROMORPHONE HYDROCHLORIDE 1 MG/ML
1 INJECTION, SOLUTION INTRAMUSCULAR; INTRAVENOUS; SUBCUTANEOUS
Status: COMPLETED | OUTPATIENT
Start: 2017-01-07 | End: 2017-01-07

## 2017-01-07 RX ADMIN — HYDROMORPHONE HYDROCHLORIDE 1 MG: 1 INJECTION, SOLUTION INTRAMUSCULAR; INTRAVENOUS; SUBCUTANEOUS at 01:01

## 2017-01-07 NOTE — OP NOTE
DATE OF PROCEDURE:  01/06/2017.    PREOPERATIVE DIAGNOSIS:  Left humeral shaft tumor.    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    PROCEDURES:  1.  Prophylactic plating, left humeral shaft lesion with adjuvant cementation   (CPT #40890).  2.  Curettage of left humerus tumor (CPT #92895).  3.  Open biopsy, left humerus lesion (CPT #53328).    SURGEON:  Dajuan Hill M.D.    ASSISTANT:  Deon Altamirano M.D. (RES)    ANESTHESIA:  Regional.    ESTIMATED BLOOD LOSS:  200 mL    SPECIMEN:  Left humerus tumor - probable metastatic carcinoma.    HISTORY:  The patient is a 72-year-old gentleman with a several month history of   progressive left arm pain.  Radiographs revealed a radiolucent lesion of the   distal humeral shaft.  He was also noted to have a concurrent lung mass,   worrisome for primary neoplasm.  Treatment options were discussed.  It was   recommended to proceed with open biopsy of the lesion to obtain histology.  If   this is proven to be metastatic carcinoma, we proceed with curettage and   cementation of the defect followed by plating of the humerus to minimize risk of   pathologic fracture.  Risks and complications were discussed including, but not   limited to the risks of anesthetic complications, infections, wound healing   complications, DVT radial nerve injury tumor recurrence and death among others   and he elected to proceed.    DESCRIPTION OF PROCEDURE:  The patient was taken to the Operating Room after   regional anesthesia was administered by the Anesthesia Department.  He was then   placed in the right lateral decubitus position with the left side up.  The left   arm and upper extremity were then sterilely prepped and draped in a normal   fashion.    A 4 cm longitudinal incision was made over the distal aspect of the triceps.    Subcutaneous tissue was dissected with electrocautery down the triceps fascia,   which was incised.  The triceps was then split  longitudinally down to the   humeral shaft.  The tumor could be seen easily eroding through the cortex of the   posterior humerus.  At this time, biopsy specimen was obtained.    Frozen section analysis revealed changes consistent with metastatic carcinoma.    With this diagnosis, we proceeded with an extended curettage, cementation, and   plating of the humerus.  The incision was extended proximally and distally in a   standard posterior approach to the humerus.  A triceps split was performed at   the insertion on to the ulna distally and the triceps was split to reveal the   posterior aspect of the distal humerus.  Care was taken not to go too proximal   to encounter the radial nerve, which was not encountered.    Rongeurs and high-speed jake were used to create a smooth cortical ovoid window   in the posterior aspect of the cortex of the humerus.  The lesion was then   thoroughly curetted.  All gross tumor was removed in this fashion and the defect   was then thoroughly irrigated.  No visual lesion remained and after a second   curettage no visual tumor remained.  One batch of cement was then mixed.  This   was placed into the defect and held in place and pressurized by fingers and held   in place as the cement polymerized.    Following this, a 6-hole Synthes posterolateral humeral locking plate was   contoured over the distal humerus.  This was secured with individual locking   screws.  In total, 3 were placed proximal to the lesion and 5 were placed distal   to the lesion.  Excellent fixation was noted.    At this time, the wound was thoroughly irrigated.  The triceps fascia was then   closed with a running stitch of 0 Vicryl, subcutaneous tissue was closed with   interrupted inverted sutures of #3-0 Vicryl.  Skin was approximated using skin   staples.  Sterile dressing was applied and he was returned to the Postanesthesia   Care Unit in stable condition.      MSM/HUGH  dd: 01/06/2017 15:25:30 (CST)  td:  01/06/2017 19:28:12 (CST)  Doc ID   #[id number]  Job ID #[id number]    CC:

## 2017-01-07 NOTE — DISCHARGE INSTRUCTIONS
Rest.  Elevate the hand when possible.  Ice for the next 24-48 hours.  Wear sling as directed.  Follow up in clinic as directed.  Return to the ED for any new or worsening symptoms.

## 2017-01-07 NOTE — ED PROVIDER NOTES
Encounter Date: 1/7/2017    SCRIBE #1 NOTE: I, Nicole Tyler, am scribing for, and in the presence of,  Dr. Abreu. I have scribed the following portions of the note - the APC attestation.       History     Chief Complaint   Patient presents with    Arm Swelling     states lesion removed from left arm yesterday/ arm swelling today     Review of patient's allergies indicates:  No Known Allergies  HPI Comments: Time seen by provider: 12:36 PM    Disclaimer: This note has been generated using voice-recognition software. There may be typographical errors that have been missed during proof-reading.    This is a 72-year-old white male with past medical history of COPD, hypertension, high cholesterol, and recent orthopedic surgery to the left arm who presents the ER with a chief complaint of left arm swelling and pain.  Patient was noted to have a lesion of his distal humerus shaft recently.  He had a scheduled biopsy yesterday, which confirmed metastatic cancer.  The bone was then scraped clean and plated.  Patient states last night his distal arm and hand began to swell and become painful.  He says once the block wore off it became significantly more painful.  He says it has continued to swell this morning.  He denies fever or chills.  This lesion is most likely metastatic from a possible lung cancer.    The history is provided by the patient.     Past Medical History   Diagnosis Date    COPD (chronic obstructive pulmonary disease)     High cholesterol     Hypertension      No past medical history pertinent negatives.  Past Surgical History   Procedure Laterality Date    Knee surgery Right 2011     rotator cuff repair    Inguinal hernia repair Right 2010    Inguinal hernia repair Left 1979     Family History   Problem Relation Age of Onset    Other Mother      smoker    Heart disease Mother      parkinsons disease    Other Father      suicide    Other Sister      smoker, parkinsons    Cancer Sister       breast    Heart disease Sister     Diabetes Sister     Heart attack Brother      Social History   Substance Use Topics    Smoking status: Former Smoker     Types: Cigarettes, Pipe, Cigars     Quit date: 1/4/2015    Smokeless tobacco: Never Used    Alcohol use No      Comment: quit 15 yrs ago     Review of Systems   Constitutional: Negative for chills and fever.   HENT: Negative for congestion.    Respiratory: Negative for shortness of breath.    Cardiovascular: Negative for chest pain.   Gastrointestinal: Positive for nausea. Negative for vomiting.   Genitourinary: Negative for difficulty urinating.   Musculoskeletal: Positive for arthralgias and joint swelling. Negative for neck pain and neck stiffness.   Skin: Positive for color change and wound (incision from recent surgery to the left upper arm and elbow). Negative for rash.   Neurological: Negative for weakness and numbness.   Psychiatric/Behavioral: Negative for confusion.       Physical Exam   Initial Vitals   BP Pulse Resp Temp SpO2   01/07/17 1114 01/07/17 1114 01/07/17 1114 01/07/17 1114 01/07/17 1114   153/72 100 19 98.1 °F (36.7 °C) 95 %     Physical Exam    Nursing note and vitals reviewed.  Constitutional: He appears well-developed and well-nourished. No distress.   HENT:   Head: Normocephalic and atraumatic.   Cardiovascular: Normal rate and regular rhythm. Exam reveals no gallop and no friction rub.    No murmur heard.  Pulses:       Radial pulses are 2+ on the right side, and 2+ on the left side.   Pulmonary/Chest: Breath sounds normal. He has no wheezes. He has no rhonchi.   Musculoskeletal:        Left elbow: He exhibits decreased range of motion and swelling. Tenderness found.        Left hand: He exhibits decreased range of motion, tenderness and swelling.   The hand is cool to touch.   Neurological: He is alert and oriented to person, place, and time.   Skin: Skin is warm and dry. No rash noted. No erythema.   The wound is still dressed  from recent surgery.  The orthopedic resident will come down to evaluate the incision         ED Course   Procedures  Labs Reviewed - No data to display          Medical Decision Making:   History:   Old Medical Records: I decided to obtain old medical records.  Differential Diagnosis:   Dependent edema, cellulitis, DVT, postop pain  Clinical Tests:   Radiological Study: Ordered and Reviewed  ED Management:  Patient presented to the ER with complaint of left arm pain and swelling.  He had orthopedic surgery performed yesterday and is here with worsening swelling of the distal forearm and hand.  He denies fever or chills.  On exam, there is some edema of the forearm and hand.  This is most likely dependent edema as the patient has not been wearing his sling appropriately and has not been elevating his hand.  He has good distal pulses.  Ultrasound of the available vessels of the arm showed no evidence for DVT.  However, the exam was limited secondary to the patient being in a splint.  Orthopedics examined the patient and feel that he is stable for discharge with follow-up with with orthopedic clinic as scheduled.  He can continue to take his medications as prescribed.  He has been instructed to followup with his PCP within the next week if symptoms not improving.  He should return to the ER for any new or worsening symptoms.  This case was discussed with my supervising physician.  Other:   I have discussed this case with another health care provider.       <> Summary of the Discussion: Orthopedics as the patient is 1 day postop            Scribe Attestation:   Scribe #1: I performed the above scribed service and the documentation accurately describes the services I performed. I attest to the accuracy of the note.    Attending Attestation:     Physician Attestation Statement for NP/PA:   I discussed this assessment and plan of this patient with the NP/PA, but I did not personally examine the patient. The face to face  encounter was performed by the NP/PA.    Other NP/PA Attestation Additions:    History of Present Illness: Left arm pain post-op         Physician Attestation for Scribe:  Physician Attestation Statement for Scribe #1: I, Dr. Abreu, reviewed documentation, as scribed by Nicole Tyler in my presence, and it is both accurate and complete.                 ED Course     Clinical Impression:   The encounter diagnosis was Edema of hand.          Claudia Harris PA-C  01/07/17 6150

## 2017-01-07 NOTE — CONSULTS
Consult Note  Orthopaedics    SUBJECTIVE:     History of Present Illness:  Patient is a 72 y.o. male who is POD1 s/p left distal humerus lesion curretage and cementing with interal fixation who presents for pain control and evaluation of left hand swelling.  Pt reports that he has been unable to take any pain medication as he could not fill his RX until this am.  Pt also with puffy edema to left hand that developed since yesterday.  Denies any other inssues or pains.  Denies fall or trauma.  Denies numbness or tingling.      Review of patient's allergies indicates:  No Known Allergies    Past Medical History   Diagnosis Date    COPD (chronic obstructive pulmonary disease)     High cholesterol     Hypertension      Past Surgical History   Procedure Laterality Date    Knee surgery Right 2011     rotator cuff repair    Inguinal hernia repair Right 2010    Inguinal hernia repair Left 1979     Family History   Problem Relation Age of Onset    Other Mother      smoker    Heart disease Mother      parkinsons disease    Other Father      suicide    Other Sister      smoker, parkinsons    Cancer Sister      breast    Heart disease Sister     Diabetes Sister     Heart attack Brother      Social History   Substance Use Topics    Smoking status: Former Smoker     Types: Cigarettes, Pipe, Cigars     Quit date: 1/4/2015    Smokeless tobacco: Never Used    Alcohol use No      Comment: quit 15 yrs ago        Review of Systems:  Patient denies constitutional symptoms, cardiac symptoms, respiratory symptoms, GI symptoms.  The remainder of the musculoskeletal ROS is included in the HPI.      OBJECTIVE:     Vital Signs (Most Recent)  Temp: 98.1 °F (36.7 °C) (01/07/17 1114)  Pulse: 100 (01/07/17 1114)  Resp: 19 (01/07/17 1114)  BP: (!) 153/72 (01/07/17 1114)  SpO2: 95 % (01/07/17 1114)    Physical Exam:  Gen:  No acute distress  CV:  Peripherally well-perfused.  Pulses 2+ bilaterally.  Lungs:  Normal respiratory  effort.  Abdomen:  Soft, non-tender, non-distended  Head/Neck:  Normocephalic.  Atraumatic. No TTP, AROM and PROM intact without pain  Neuro:  CN intact without deficit, SILT throughout B/L Upper & Lower Extremities  Pelvis: No TTP, Stable to direct anterior pressure over ASIS.    MSK:  LUE:  - dressings c/d/i  -Puffy diffuse edema to left hand and fingers  - AROM and PROM hand and fingers  - AIN/PIN/Radial/Median/Ulnar Nerves assessed in isolation without deficit  - SILT throughout  - Radial & Ulnar arteries palpated 2+  - Capillary Refill <3s        Laboratory:  Recent Results (from the past 72 hour(s))   Basic metabolic panel    Collection Time: 01/04/17  3:35 PM   Result Value Ref Range    Sodium 137 136 - 145 mmol/L    Potassium 3.7 3.5 - 5.1 mmol/L    Chloride 103 95 - 110 mmol/L    CO2 23 23 - 29 mmol/L    Glucose 87 70 - 110 mg/dL    BUN, Bld 12 8 - 23 mg/dL    Creatinine 0.8 0.5 - 1.4 mg/dL    Calcium 9.3 8.7 - 10.5 mg/dL    Anion Gap 11 8 - 16 mmol/L    eGFR if African American >60.0 >60 mL/min/1.73 m^2    eGFR if non African American >60.0 >60 mL/min/1.73 m^2   CBC auto differential    Collection Time: 01/04/17  3:35 PM   Result Value Ref Range    WBC 5.86 3.90 - 12.70 K/uL    RBC 3.13 (L) 4.60 - 6.20 M/uL    Hemoglobin 8.8 (L) 14.0 - 18.0 g/dL    Hematocrit 26.6 (L) 40.0 - 54.0 %    MCV 85 82 - 98 fL    MCH 28.1 27.0 - 31.0 pg    MCHC 33.1 32.0 - 36.0 %    RDW 14.2 11.5 - 14.5 %    Platelets 332 150 - 350 K/uL    MPV 9.6 9.2 - 12.9 fL    Gran # 3.0 1.8 - 7.7 K/uL    Lymph # 1.0 1.0 - 4.8 K/uL    Mono # 1.3 (H) 0.3 - 1.0 K/uL    Eos # 0.4 0.0 - 0.5 K/uL    Baso # 0.10 0.00 - 0.20 K/uL    Gran% 51.5 38.0 - 73.0 %    Lymph% 16.4 (L) 18.0 - 48.0 %    Mono% 22.9 (H) 4.0 - 15.0 %    Eosinophil% 7.5 0.0 - 8.0 %    Basophil% 1.7 0.0 - 1.9 %    Differential Method Automated        Diagnostic Results:   No DVT on US    ASSESSMENT/PLAN:     A/P: Derek Rodrigo is a 72 y.o. with left hand swelling s/p elbow surgery as  well as pain after block wore off.  DVT US negative. Loosened ace wrap dressing. Discussed importance of elevation of LUE with pt.  Discussed pain control with pt and scheduling pain medication for first 2 days post operatively.  Pt stable for d/c home with clinic f/u as scheduled.

## 2017-01-07 NOTE — ED AVS SNAPSHOT
[facility]  [address]  [facility] [address]               Derek Hatchaud   2017 12:27 PM   ED    Description:  Male : 1945   Department:  [facility]           Your Care was Coordinated By:     Provider Role From To    Dorothy Abreu MD Attending Provider 17 1242 --    Claudia Harris PA-C Physician Assistant 17 1237 --      Reason for Visit     Arm Swelling           Diagnoses this Visit        Comments    Edema of hand    -  Primary       ED Disposition     ED Disposition Condition Comment    Discharge             To Do List           Follow-up Information     Schedule an appointment as soon as possible for a visit with [facility].    Specialty:  Orthopedics    Contact information:    [address]  [facility] [id number] [phone]    Additional information:    [facility] - 5th Floor      [facility]     [facility] On Call Nurse Care Line -  Assistance  Registered nurses in the [facility] On Call Center provide clinical advisement, health education, appointment booking, and other advisory services.  Call for this free service at [phone].             Medications           Message regarding Medications     Verify the changes and/or additions to your medication regime listed below are the same as discussed with your clinician today.  If any of these changes or additions are incorrect, please notify your healthcare provider.        These medications were administered today        Dose Freq    hydromorphone (PF) injection 1 mg 1 mg ED 1 Time    Sig: Inject 1 mL (1 mg total) into the muscle ED 1 Time.    Class: Normal    Route: Intramuscular           Verify that the below list of medications is an accurate representation of the medications you are currently taking.  If none reported, the list may be blank. If incorrect, please contact your healthcare provider. Carry this list with you in case of  "emergency.           Current Medications     aspirin (ECOTRIN) 81 MG EC tablet Take 81 mg by mouth once daily.    Ca-D3-mag ox-zinc--lisette-bor (CALCIUM 600-D3 PLUS) 600 mg calcium- 800 unit-50 mg Tab Take by mouth once daily.    cyanocobalamin (VITAMIN B-12) 1000 MCG tablet Take 100 mcg by mouth once daily.    docusate sodium 100 mg capsule Take 100 mg by mouth 2 (two) times daily.    ERGOCALCIFEROL, VITAMIN D2, (VITAMIN D ORAL) Take 400 % by mouth once daily.    mupirocin (BACTROBAN) 2 % ointment Apply topically 3 (three) times daily.    nifedipine (ADALAT CC) 60 MG TbSR Take 30 mg by mouth once daily.    ondansetron (ZOFRAN-ODT) 4 MG TbDL Take 1 tablet (4 mg total) by mouth every 8 (eight) hours as needed.    oxycodone-acetaminophen (PERCOCET)  mg per tablet Take 1 tablet by mouth every 4 (four) hours as needed for Pain.    oxymetazoline (AFRIN) 0.05 % nasal spray 2 sprays by Nasal route 2 (two) times daily.    potassium gluconate 500 mg (83 mg) Tab Take by mouth once daily.    pravastatin (PRAVACHOL) 40 MG tablet Take 40 mg by mouth once daily.    tamsulosin (FLOMAX) 0.4 mg Cp24 Take 0.4 mg by mouth once daily.    tiotropium (SPIRIVA) 18 mcg inhalation capsule Inhale 18 mcg into the lungs once daily.    albuterol 90 mcg/actuation inhaler Inhale 2 puffs into the lungs every 6 (six) hours as needed for Wheezing.    amoxicillin-clavulanate 875-125mg (AUGMENTIN) 875-125 mg per tablet Take 1 tablet by mouth 2 (two) times daily.    hydrocodone-acetaminophen 5-325mg (NORCO) 5-325 mg per tablet Take 1 tablet by mouth every 6 (six) hours as needed for Pain.    sodium chloride (OCEAN) 0.65 % nasal spray 1 spray by Nasal route as needed for Congestion.           Clinical Reference Information           Your Vitals Were     BP Pulse Temp Resp Height Weight    153/72 (BP Location: Right arm, Patient Position: Sitting) 100 98.1 °F (36.7 °C) (Oral) 19 5' 8" (1.727 m) 74.8 kg (165 lb)    SpO2 BMI             95% 25.09 " kg/m2         Allergies as of 1/7/2017     No Known Allergies      Immunizations Administered on Date of Encounter - 1/7/2017     None      ED Micro, Lab, POCT     None      ED Imaging Orders     Start Ordered       Status Ordering Provider    01/07/17 1253 01/07/17 1252  US Upper Extremity Veins Left  1 time imaging      Final result         Discharge Instructions       Rest.  Elevate the hand when possible.  Ice for the next 24-48 hours.  Wear sling as directed.  Follow up in clinic as directed.  Return to the ED for any new or worsening symptoms.    [facility] Sign-Up     Activating your [facility] account is as easy as 1-2-3!     1) Visit [email or web address], select Sign Up Now, enter this activation code and your date of birth, then select Next.  [email or web address]  Expires: 2/20/2017  4:17 PM      2) Create a username and password to use when you visit [facility] in the future and select a security question in case you lose your password and select Next.    3) Enter your e-mail address and click Sign Up!    Additional Information  If you have questions, please e-mail [email or web address] or call [phone] to talk to our [facility] staff. Remember, [facility] is NOT to be used for urgent needs. For medical emergencies, dial 911.         Smoking Cessation     If you would like to quit smoking:   You may be eligible for free services if you are a [place] resident and started smoking cigarettes before September 1, 1988.  Call the [employer]oking [employer]ess[employer] ([employer]) toll free at (975) 044-8026 or (014) 043-0351.   Call 4-871-QUIT-NOW if you do not meet the above criteria.             [facility] complies with applicable Federal civil rights laws and does not discriminate on the basis of race, color, national origin, age, disability, or sex.        Language Assistance Services     ATTENTION: Language assistance services are available, free of charge. Please call [phone].      ATENCIÓN: Jenny reyna  español, tiene a collier disposición servicios gratuitos de asistencia lingüística. Llame al [phone].     YAYA Ý: N?u b?n nói Ti?ng Vi?t, có các d?ch v? h? tr? ngôn ng? mi?n phí dành cho b?n. G?i s? [phone].

## 2017-01-10 ENCOUNTER — TELEPHONE (OUTPATIENT)
Dept: ORTHOPEDICS | Facility: CLINIC | Age: 72
End: 2017-01-10

## 2017-01-10 NOTE — TELEPHONE ENCOUNTER
Direct call from [facility] who states pt informed her that Ortho appt has been cancelled and that pt has left [facility]. Reviewed with ANABELL James MA and confirmed that [facility] Gates has spoken with pt and today's appt is cancelled. Notified [facility] of same.

## 2017-01-10 NOTE — TELEPHONE ENCOUNTER
Direct call to Ortho Triage per pt - L humerus excision 1/6/17 - who was seen in ED on 1/7/17 for LUE swelling. Pt, presently, at [facility] with Dr. Valdivia who reports LUE swollen, radial pulse present and capillary refill WNL. Pt states that he has not been able to elevate LUE for more than 30 minutes at a time due to pain. Appt scheduled with [facility] Gates at 3:30pm.  Dr. Hill notified and to contact /[facility] .at [phone].

## 2017-01-10 NOTE — TELEPHONE ENCOUNTER
[facility] and [facility]. States understanding that pt is to come to [facility] Clinic appt with [facility] Gates at 3:30pm. Reviewed with [facility] Gates.

## 2017-01-10 NOTE — TELEPHONE ENCOUNTER
Spoke with pt who is at [facility] and is trying to arrange transportation to [facility] ED, as he states transportation to Ortho Clinic may not be possible. Advised pt that Dr. Hill, as his surgeon, is expecting pt in Ortho Clinic today. Spoke with Dr. Martinez/[facility] who states pt refused repeat ultrasound. Dr. Martinez states that she advises pt to be seen by his surgeon/staff at [facility], rather than at [facility] ED and will attempt to [facility] for pt to be seen by [facility] Gates. Ortho Triage contact number provided to Dr. Martinez for follow up. Dr. [facility] contact number: [phone] Ext. [phone]

## 2017-01-11 ENCOUNTER — DOCUMENTATION ONLY (OUTPATIENT)
Dept: ORTHOPEDICS | Facility: CLINIC | Age: 72
End: 2017-01-11

## 2017-01-11 DIAGNOSIS — M89.9 LESION OF LEFT HUMERUS: Primary | ICD-10-CM

## 2017-01-11 DIAGNOSIS — Z09 S/P ORTHOPEDIC SURGERY, FOLLOW-UP EXAM: ICD-10-CM

## 2017-01-11 NOTE — PROGRESS NOTES
Orders:  Compression sleeve glove and sling fax to Dr. Arcelia BAL [facility] [phone]. Done.

## 2017-01-25 ENCOUNTER — OFFICE VISIT (OUTPATIENT)
Dept: ORTHOPEDICS | Facility: CLINIC | Age: 72
End: 2017-01-25
Payer: COMMERCIAL

## 2017-01-25 VITALS
BODY MASS INDEX: 24.41 KG/M2 | DIASTOLIC BLOOD PRESSURE: 71 MMHG | HEIGHT: 68 IN | SYSTOLIC BLOOD PRESSURE: 138 MMHG | WEIGHT: 161.06 LBS

## 2017-01-25 DIAGNOSIS — G89.18 POST-OP PAIN: Primary | ICD-10-CM

## 2017-01-25 PROCEDURE — 99999 PR PBB SHADOW E&M-EST. PATIENT-LVL IV: CPT | Mod: PBBFAC,,, | Performed by: NURSE PRACTITIONER

## 2017-01-25 PROCEDURE — 99214 OFFICE O/P EST MOD 30 MIN: CPT | Mod: PBBFAC | Performed by: NURSE PRACTITIONER

## 2017-01-25 PROCEDURE — 99024 POSTOP FOLLOW-UP VISIT: CPT | Mod: ,,, | Performed by: NURSE PRACTITIONER

## 2017-01-25 RX ORDER — OXYCODONE AND ACETAMINOPHEN 10; 325 MG/1; MG/1
1 TABLET ORAL EVERY 4 HOURS PRN
Qty: 90 TABLET | Refills: 0 | Status: SHIPPED | OUTPATIENT
Start: 2017-01-25

## 2017-01-26 NOTE — PROGRESS NOTES
Pt is s/p Prophylactic plating, left humeral shaft lesion with adjuvant cementation, curettage of left humerus tumor, and open biopsy, left humerus lesion on 1/6/17 by Dr Hill. He returns today for staple removal. Incision is well healed with no erythema or drainage. Staples removed without difficulty and steri strips placed. Pt will maintain his arm in the sling for the next four weeks with pendulum exercises several times daily to prevent shoulder pain and stiffness. Pathology report given to pt. [facility]

## 2017-02-14 NOTE — PROGRESS NOTES
"Spoke with patient he is having " a lot" of swelling in his arm/ hand increasing his pain. Patient stated that he has tried to elevate it though is having trouble finding a comfortable position. Patient was seen in ED 2 days ago and DVT was ruled out. He is taking Percocet as prescribed. He cannot take NSAIDS due to past medical history.  He stated that he is wearing the sling but it seems to be too small for his arm and would like a different one.   I spoke with patient and addressed all concerns. New order for replacement sling placed. Order for compression sleeve also written. Both to be faxed to PCP. Instructions given on elevation with pillow given. Patient stated that he will try this and call if this continues to be a problem.   " home w/ parent

## (undated) DEVICE — SEE MEDLINE ITEM 152622

## (undated) DEVICE — APPLICATOR CHLORAPREP ORN 26ML

## (undated) DEVICE — STOCKINET 4INX48

## (undated) DEVICE — BURR ROUND 5MM SHORT 5EA/BX

## (undated) DEVICE — DRESSING XEROFORM FOIL PK 1X8

## (undated) DEVICE — SLING ARM COMFT NAVY BLU MED

## (undated) DEVICE — BLADE SURG CARBON STEEL #10

## (undated) DEVICE — BLADE SAGITTA 5/BX

## (undated) DEVICE — SEE MEDLINE ITEM 152764

## (undated) DEVICE — TRAY MINOR ORTHO

## (undated) DEVICE — SEE MEDLINE ITEM 157150

## (undated) DEVICE — ELECTRODE REM PLYHSV RETURN 9

## (undated) DEVICE — PADDING CAST 6 INCH

## (undated) DEVICE — IMMOBILIZER SHOULDER RAINBOW M

## (undated) DEVICE — SEE MEDLINE ITEM 146417

## (undated) DEVICE — BIT DRILL 2.8 W/QC PERCU 200MM

## (undated) DEVICE — GAUZE SPONGE 4X4 12PLY

## (undated) DEVICE — TUBE SUCTION YANKAUER

## (undated) DEVICE — SPONGE LAP 18X18 PREWASHED

## (undated) DEVICE — BANDAGE ELASTIC 6 X 5 YD